# Patient Record
Sex: MALE | Race: WHITE | Employment: FULL TIME | URBAN - NONMETROPOLITAN AREA
[De-identification: names, ages, dates, MRNs, and addresses within clinical notes are randomized per-mention and may not be internally consistent; named-entity substitution may affect disease eponyms.]

---

## 2017-05-24 PROBLEM — K80.00 CALCULUS OF GALLBLADDER WITH ACUTE CHOLECYSTITIS WITHOUT OBSTRUCTION: Status: ACTIVE | Noted: 2017-05-24

## 2017-09-26 ENCOUNTER — HOSPITAL ENCOUNTER (EMERGENCY)
Age: 27
Discharge: HOME | End: 2017-09-26
Payer: MEDICAID

## 2017-09-26 VITALS
DIASTOLIC BLOOD PRESSURE: 66 MMHG | HEIGHT: 69 IN | RESPIRATION RATE: 16 BRPM | HEART RATE: 85 BPM | TEMPERATURE: 98.2 F | BODY MASS INDEX: 25.18 KG/M2 | WEIGHT: 170 LBS | SYSTOLIC BLOOD PRESSURE: 121 MMHG | OXYGEN SATURATION: 98 %

## 2017-09-26 DIAGNOSIS — K08.9 CHRONIC DENTAL PAIN: Primary | ICD-10-CM

## 2017-09-26 DIAGNOSIS — G89.29 CHRONIC DENTAL PAIN: Primary | ICD-10-CM

## 2017-09-26 PROCEDURE — 99213 OFFICE O/P EST LOW 20 MIN: CPT | Performed by: NURSE PRACTITIONER

## 2017-09-26 PROCEDURE — 99212 OFFICE O/P EST SF 10 MIN: CPT

## 2017-09-26 RX ORDER — CLINDAMYCIN HYDROCHLORIDE 150 MG/1
150 CAPSULE ORAL 3 TIMES DAILY
Qty: 30 CAPSULE | Refills: 0 | Status: SHIPPED | OUTPATIENT
Start: 2017-09-26 | End: 2017-10-06

## 2017-09-26 ASSESSMENT — PAIN SCALES - GENERAL: PAINLEVEL_OUTOF10: 6

## 2017-09-26 ASSESSMENT — PAIN DESCRIPTION - PAIN TYPE: TYPE: ACUTE PAIN

## 2017-09-26 ASSESSMENT — PAIN DESCRIPTION - LOCATION: LOCATION: TEETH

## 2017-09-26 ASSESSMENT — PAIN DESCRIPTION - ORIENTATION: ORIENTATION: LEFT;LOWER

## 2017-09-26 ASSESSMENT — ENCOUNTER SYMPTOMS
SORE THROAT: 1
RHINORRHEA: 0

## 2017-09-28 ENCOUNTER — APPOINTMENT (OUTPATIENT)
Dept: GENERAL RADIOLOGY | Age: 27
End: 2017-09-28
Payer: MEDICAID

## 2017-09-28 ENCOUNTER — HOSPITAL ENCOUNTER (EMERGENCY)
Age: 27
Discharge: HOME | End: 2017-09-28
Attending: FAMILY MEDICINE
Payer: MEDICAID

## 2017-09-28 VITALS
TEMPERATURE: 98.1 F | DIASTOLIC BLOOD PRESSURE: 74 MMHG | BODY MASS INDEX: 25.84 KG/M2 | HEART RATE: 62 BPM | OXYGEN SATURATION: 98 % | SYSTOLIC BLOOD PRESSURE: 118 MMHG | WEIGHT: 175 LBS | RESPIRATION RATE: 19 BRPM

## 2017-09-28 DIAGNOSIS — R07.9 CHEST PAIN, UNSPECIFIED TYPE: Primary | ICD-10-CM

## 2017-09-28 DIAGNOSIS — F12.10 MARIJUANA ABUSE: ICD-10-CM

## 2017-09-28 LAB
AMPHETAMINE+METHAMPHETAMINE URINE SCREEN: NEGATIVE
ANION GAP SERPL CALCULATED.3IONS-SCNC: 10 MEQ/L (ref 8–16)
BARBITURATE QUANTITATIVE URINE: NEGATIVE
BASOPHILS # BLD: 0.6 %
BASOPHILS ABSOLUTE: 0 THOU/MM3 (ref 0–0.1)
BENZODIAZEPINE QUANTITATIVE URINE: NEGATIVE
BUN BLDV-MCNC: 10 MG/DL (ref 7–22)
CALCIUM SERPL-MCNC: 9.3 MG/DL (ref 8.5–10.5)
CANNABINOID QUANTITATIVE URINE: POSITIVE
CHLORIDE BLD-SCNC: 98 MEQ/L (ref 98–111)
CO2: 29 MEQ/L (ref 23–33)
COCAINE METABOLITE QUANTITATIVE URINE: NEGATIVE
CREAT SERPL-MCNC: 1.1 MG/DL (ref 0.4–1.2)
D-DIMER QUANTITATIVE: 440 NG/ML FEU (ref 0–500)
EKG ATRIAL RATE: 51 BPM
EKG ATRIAL RATE: 78 BPM
EKG P AXIS: 72 DEGREES
EKG P AXIS: 73 DEGREES
EKG P-R INTERVAL: 164 MS
EKG P-R INTERVAL: 180 MS
EKG Q-T INTERVAL: 378 MS
EKG Q-T INTERVAL: 438 MS
EKG QRS DURATION: 94 MS
EKG QRS DURATION: 98 MS
EKG QTC CALCULATION (BAZETT): 403 MS
EKG QTC CALCULATION (BAZETT): 430 MS
EKG R AXIS: -76 DEGREES
EKG R AXIS: -77 DEGREES
EKG T AXIS: 59 DEGREES
EKG T AXIS: 67 DEGREES
EKG VENTRICULAR RATE: 51 BPM
EKG VENTRICULAR RATE: 78 BPM
EOSINOPHIL # BLD: 0.4 %
EOSINOPHILS ABSOLUTE: 0 THOU/MM3 (ref 0–0.4)
GFR SERPL CREATININE-BSD FRML MDRD: 79 ML/MIN/1.73M2
GLUCOSE BLD-MCNC: 80 MG/DL (ref 70–108)
HCT VFR BLD CALC: 49.7 % (ref 42–52)
HEMOGLOBIN: 17.1 GM/DL (ref 14–18)
LIPASE: 24.7 U/L (ref 5.6–51.3)
LYMPHOCYTES # BLD: 45 %
LYMPHOCYTES ABSOLUTE: 2.5 THOU/MM3 (ref 1–4.8)
MCH RBC QN AUTO: 31.8 PG (ref 27–31)
MCHC RBC AUTO-ENTMCNC: 34.4 GM/DL (ref 33–37)
MCV RBC AUTO: 92.5 FL (ref 80–94)
MONOCYTES # BLD: 13 %
MONOCYTES ABSOLUTE: 0.7 THOU/MM3 (ref 0.4–1.3)
NUCLEATED RED BLOOD CELLS: 0 /100 WBC
OPIATES, URINE: NEGATIVE
OSMOLALITY CALCULATION: 271.8 MOSMOL/KG (ref 275–300)
OXYCODONE: NEGATIVE
PDW BLD-RTO: 13.8 % (ref 11.5–14.5)
PHENCYCLIDINE QUANTITATIVE URINE: NEGATIVE
PLATELET # BLD: 143 THOU/MM3 (ref 130–400)
PMV BLD AUTO: 9.1 MCM (ref 7.4–10.4)
POTASSIUM SERPL-SCNC: 4.3 MEQ/L (ref 3.5–5.2)
PRO-BNP: 16.3 PG/ML (ref 0–450)
RBC # BLD: 5.37 MILL/MM3 (ref 4.7–6.1)
RBC # BLD: NORMAL 10*6/UL
SEG NEUTROPHILS: 41 %
SEGMENTED NEUTROPHILS ABSOLUTE COUNT: 2.3 THOU/MM3 (ref 1.8–7.7)
SODIUM BLD-SCNC: 137 MEQ/L (ref 135–145)
TROPONIN T: < 0.01 NG/ML
TROPONIN T: < 0.01 NG/ML
WBC # BLD: 5.6 THOU/MM3 (ref 4.8–10.8)

## 2017-09-28 PROCEDURE — 84484 ASSAY OF TROPONIN QUANT: CPT

## 2017-09-28 PROCEDURE — 6360000002 HC RX W HCPCS: Performed by: FAMILY MEDICINE

## 2017-09-28 PROCEDURE — 85379 FIBRIN DEGRADATION QUANT: CPT

## 2017-09-28 PROCEDURE — 93005 ELECTROCARDIOGRAM TRACING: CPT | Performed by: FAMILY MEDICINE

## 2017-09-28 PROCEDURE — 80048 BASIC METABOLIC PNL TOTAL CA: CPT

## 2017-09-28 PROCEDURE — 80307 DRUG TEST PRSMV CHEM ANLYZR: CPT

## 2017-09-28 PROCEDURE — 83880 ASSAY OF NATRIURETIC PEPTIDE: CPT

## 2017-09-28 PROCEDURE — 83690 ASSAY OF LIPASE: CPT

## 2017-09-28 PROCEDURE — 36415 COLL VENOUS BLD VENIPUNCTURE: CPT

## 2017-09-28 PROCEDURE — S0028 INJECTION, FAMOTIDINE, 20 MG: HCPCS | Performed by: FAMILY MEDICINE

## 2017-09-28 PROCEDURE — 96375 TX/PRO/DX INJ NEW DRUG ADDON: CPT

## 2017-09-28 PROCEDURE — 71010 XR CHEST PORTABLE: CPT

## 2017-09-28 PROCEDURE — 85025 COMPLETE CBC W/AUTO DIFF WBC: CPT

## 2017-09-28 PROCEDURE — 6370000000 HC RX 637 (ALT 250 FOR IP): Performed by: FAMILY MEDICINE

## 2017-09-28 PROCEDURE — 2500000003 HC RX 250 WO HCPCS: Performed by: FAMILY MEDICINE

## 2017-09-28 PROCEDURE — 99285 EMERGENCY DEPT VISIT HI MDM: CPT

## 2017-09-28 PROCEDURE — 96374 THER/PROPH/DIAG INJ IV PUSH: CPT

## 2017-09-28 RX ORDER — ONDANSETRON 2 MG/ML
4 INJECTION INTRAMUSCULAR; INTRAVENOUS ONCE
Status: COMPLETED | OUTPATIENT
Start: 2017-09-28 | End: 2017-09-28

## 2017-09-28 RX ORDER — OMEPRAZOLE 20 MG/1
20 CAPSULE, DELAYED RELEASE ORAL DAILY
Qty: 20 CAPSULE | Refills: 0 | Status: SHIPPED | OUTPATIENT
Start: 2017-09-28 | End: 2018-07-08

## 2017-09-28 RX ORDER — ORPHENADRINE CITRATE 30 MG/ML
60 INJECTION INTRAMUSCULAR; INTRAVENOUS ONCE
Status: DISCONTINUED | OUTPATIENT
Start: 2017-09-28 | End: 2017-09-28 | Stop reason: HOSPADM

## 2017-09-28 RX ADMIN — FAMOTIDINE 20 MG: 10 INJECTION, SOLUTION INTRAVENOUS at 11:04

## 2017-09-28 RX ADMIN — ONDANSETRON 4 MG: 2 INJECTION INTRAMUSCULAR; INTRAVENOUS at 11:04

## 2017-09-28 RX ADMIN — Medication 30 ML: at 11:02

## 2017-09-28 ASSESSMENT — ENCOUNTER SYMPTOMS
BACK PAIN: 0
NAUSEA: 1
VOMITING: 1
ABDOMINAL PAIN: 0
COUGH: 0
RHINORRHEA: 0
EYE DISCHARGE: 0
SHORTNESS OF BREATH: 0
TROUBLE SWALLOWING: 1
SORE THROAT: 0
EYE REDNESS: 0
DIARRHEA: 0
WHEEZING: 0

## 2017-09-28 ASSESSMENT — PAIN DESCRIPTION - PROGRESSION: CLINICAL_PROGRESSION: GRADUALLY IMPROVING

## 2017-09-28 ASSESSMENT — PAIN DESCRIPTION - FREQUENCY: FREQUENCY: CONTINUOUS

## 2017-09-28 ASSESSMENT — PAIN DESCRIPTION - PAIN TYPE: TYPE: ACUTE PAIN

## 2017-09-28 ASSESSMENT — PAIN SCALES - GENERAL: PAINLEVEL_OUTOF10: 4

## 2017-09-28 ASSESSMENT — PAIN DESCRIPTION - ORIENTATION: ORIENTATION: LEFT;MID

## 2017-09-28 ASSESSMENT — PAIN DESCRIPTION - ONSET: ONSET: SUDDEN

## 2017-09-28 ASSESSMENT — PAIN DESCRIPTION - LOCATION: LOCATION: CHEST

## 2017-09-28 ASSESSMENT — PAIN DESCRIPTION - DESCRIPTORS: DESCRIPTORS: ACHING;SHARP

## 2017-09-28 NOTE — ED PROVIDER NOTES
Reviewed   CBC WITH AUTO DIFFERENTIAL - Abnormal; Notable for the following:        Result Value    MCH 31.8 (*)     All other components within normal limits   GLOMERULAR FILTRATION RATE, ESTIMATED - Abnormal; Notable for the following:     Est, Glom Filt Rate 79 (*)     All other components within normal limits   OSMOLALITY - Abnormal; Notable for the following:     Osmolality Calc 271.8 (*)     All other components within normal limits   BASIC METABOLIC PANEL   BRAIN NATRIURETIC PEPTIDE   TROPONIN   URINE DRUG SCREEN   D-DIMER, QUANTITATIVE   LIPASE   ANION GAP   TROPONIN       EMERGENCY DEPARTMENT COURSE:   Vitals:    Vitals:    09/28/17 1022 09/28/17 1153 09/28/17 1216 09/28/17 1331   BP: 135/83 111/72  118/74   Pulse: 83 69  62   Resp: 13 14  19   Temp: 98.1 °F (36.7 °C)      TempSrc: Oral      SpO2: 96% 97%  98%   Weight:   175 lb (79.4 kg)        10:41 AM: The patient was seen and evaluated. MDM:  The patient was seen and evaluated within the ED today following chest pain. Within the department, I observed the patient's vital signs to be within acceptable range. On exam, I appreciated mid chest tenderness. Radiological studies within the department revealed no acute cardiopulmonary process. Laboratory work was reassuring with normal troponin and anion gap levels. Within the department, the patient was treated with Norflex, Pepcid, Zofran, and gi cocktail. I observed the patient's condition to improve during the duration of their stay. The patient was offered a cardiologist within the Hospital but declined saying he preferred to follow up with his sister's cardiologist. I recommended several primary care providers but he denied any. I recommended subsequent evaluation including an EG and a colonoscopy. I explained my proposed course of treatment to the patient, and they were amenable to my decision.  They were discharged home, and they will return to the ED if their symptoms become more severe in nature, or otherwise change. He was also advised to establish care with a primary care physician. CRITICAL CARE:   None     CONSULTS:  None    PROCEDURES:  None     FINAL IMPRESSION      1. Chest pain, atypical    2. Marijuana abuse          DISPOSITION/PLAN   Discharge    PATIENT REFERRED TO:  No follow-up provider specified. DISCHARGE MEDICATIONS:  Discharge Medication List as of 9/28/2017  1:40 PM      START taking these medications    Details   omeprazole (PRILOSEC) 20 MG delayed release capsule Take 1 capsule by mouth daily, Disp-20 capsule, R-0Print             (Please note that portions of this note were completed with a voice recognition program.  Efforts were made to edit the dictations but occasionally words are mis-transcribed.)    Scribe:  Weston Lyons 9/28/17 10:41 AM Scribing for and in the presence of Adolfo Lund MD.    Signed by: Esperanza Galvez, 09/28/17 10:47 PM    Provider:  I personally performed the services described in the documentation, reviewed and edited the documentation which was dictated to the scribe in my presence, and it accurately records my words and actions.     Adolfo Lund MD 9/28/17 10:47 PM         Adolfo Lund MD  09/28/17 1870

## 2017-09-28 NOTE — ED AVS SNAPSHOT
After Visit Summary  (Discharge Instructions)    Medication List for Home    Based on the information you provided to us as well as any changes during this visit, the following is your updated medication list.  Compare this with your prescription bottles at home. If you have any questions or concerns, contact your primary care physician's office. Daily Medication List (This medication list can be shared with any Healthcare provider who is helping you manage your medications)      There are NEW medications for you. START taking them after you leave the hospital     omeprazole 20 MG delayed release capsule   Commonly known as:  PRILOSEC   Take 1 capsule by mouth daily         ASK your doctor about these medications if you have questions     clindamycin 150 MG capsule   Commonly known as:  CLEOCIN   Take 1 capsule by mouth 3 times daily for 10 days            Where to Get Your Medications      You can get these medications from any pharmacy     Bring a paper prescription for each of these medications     omeprazole 20 MG delayed release capsule               Allergies as of 9/28/2017        Reactions    Sulfa Antibiotics       Immunizations as of 9/28/2017     No immunizations on file. After Visit Summary    This summary was created for you. Thank you for entrusting your care to us. The following information includes details about your hospital/visit stay along with steps you should take to help with your recovery once you leave the hospital.  In this packet, you will find information about the topics listed below:    · Instructions about your medications including a list of your home medications  · A summary of your hospital visit  · Follow-up appointments once you have left the hospital  · Your care plan at home      You may receive a survey regarding the care you received during your stay. Your input is valuable to us.  We encourage you to complete and return your survey in the envelope provided. We hope you will choose us in the future for your healthcare needs. Patient Information     Patient Name HUYEN Carlton 1990      Care Provided at:     Name Address Phone       [address] [phone]            Your Visit    Here you will find information about your visit, including the reason for your visit. Please take this sheet with you when you visit your doctor or other health care provider in the future. It will help determine the best possible medical care for you at that time. If you have any questions once you leave the hospital, please call the department phone number listed below. Diagnoses this visit     Your diagnosis was CHEST PAIN, UNSPECIFIED TYPE. Visit Information     Date of Visit Department Dept Phone    2017 [facility] EMERGENCY DEPT [phone]      You were seen by     You were seen by Trinh Evans MD.       Follow-up Appointments    Below is a list of your follow-up and future appointments. This may not be a complete list as you may have made appointments directly with providers that we are not aware of or your providers may have made some for you. Please call your providers to confirm appointments. It is important to keep your appointments. Please bring your current insurance card, photo ID, co-pay, and all medication bottles to your appointment. If self-pay, payment is expected at the time of service. Preventive Care        Date Due    HIV screening is recommended for all people regardless of risk factors  aged 15-65 years at least once (lifetime) who have never been HIV tested.  2004    Tetanus Combination Vaccine (1 - Tdap) 2008    Pneumococcal Vaccine - Pneumovax for adults aged 19-64 years with: chronic heart disease, chronic lung disease, diabetes mellitus, alcoholism, chronic liver disease, or cigarette smoking. (1 of 1 - PPSV23) 4/17/2008    Yearly Flu Vaccine (1) 9/1/2017                 Care Plan Once You Return Home    This section includes instructions you will need to follow once you leave the hospital.  Your care team will discuss these with you, so you and those caring for you know how to best care for your health needs at home. This section may also include educational information about certain health topics that may be of help to you. Important Information if you smoke or are exposed to smoking       SMOKING: QUIT SMOKING. THIS IS THE MOST IMPORTANT ACTION YOU CAN TAKE TO IMPROVE YOUR CURRENT AND FUTURE HEALTH. Call the [facility] [place] [facility] [place] NOW ([phone])    Smoking harms nonsmokers. When nonsmokers are around people who smoke, they absorb nicotine, carbon monoxide, and other ingredients of tobacco smoke. DO NOT SMOKE AROUND CHILDREN     Children exposed to secondhand smoke are at an increased risk of:  Sudden Infant Death Syndrome (SIDS), acute respiratory infections, inflammation of the middle ear, and severe asthma. Over a longer time, it causes heart disease and lung cancer. There is no safe level of exposure to secondhand smoke. Important information for a smoker       SMOKING: QUIT SMOKING. THIS IS THE MOST IMPORTANT ACTION YOU CAN TAKE TO IMPROVE YOUR CURRENT AND FUTURE HEALTH. Call the [facility] [place] [facility] [place] NOW ([phone])    Smoking harms nonsmokers. When nonsmokers are around people who smoke, they absorb nicotine, carbon monoxide, and other ingredients of tobacco smoke. DO NOT SMOKE AROUND CHILDREN     Children exposed to secondhand smoke are at an increased risk of:  Sudden Infant Death Syndrome (SIDS), acute respiratory infections, inflammation of the middle ear, and severe asthma.   Over a longer time, it causes heart disease and lung cancer. There is no safe level of exposure to secondhand smoke. MyChart Signup     Our records indicate that you have an active Cooleafhart account. You can view your After Visit Summary by going to [email or web address] and logging in with your [employer] username and password. If you don't have a [employer] username and password but a parent or guardian has access to your record, the parent or guardian should login with their own [employer] username and password and access your record to view the After Visit Summary. Additional Information  If you have questions, please contact the physician practice where you receive care. Remember, Cooleafhart is NOT to be used for urgent needs. For medical emergencies, dial 911. For questions regarding your Cooleafhart account call [phone]. If you have a clinical question, please call your doctor's office. View your information online  ? Review your current list of  medications, immunization, and allergies. ? Review your future test results online . ? Review your discharge instructions provided by your caregivers at discharge    Certain functionality such as prescription refills, scheduling appointments or sending messages to your provider are not activated if your provider does not use REVENTIVE in his/her office    For questions regarding your MyChart account call [phone]. If you have a clinical question, please call your doctor's office. The information on all pages of the After Visit Summary has been reviewed with me, the patient and/or responsible adult, by my health care provider(s). I had the opportunity to ask questions regarding this information. I understand I should dispose of my armband safely at home to protect my health information. A complete copy of the After Visit Summary has been given to me, the patient and/or responsible adult. ¨ Shortness of breath. ¨ Nausea or vomiting. ¨ Pain, pressure, or a strange feeling in your back, neck, jaw, or upper belly or in one or both shoulders or arms. ¨ Lightheadedness or sudden weakness. ¨ A fast or irregular heartbeat. After you call 911, the  may tell you to chew 1 adult-strength or 2 to 4 low-dose aspirin. Wait for an ambulance. Do not try to drive yourself. Call your doctor today if:  · You have any trouble breathing. · Your chest pain gets worse. · You are dizzy or lightheaded, or you feel like you may faint. · You are not getting better as expected. · You are having new or different chest pain. Where can you learn more? Go to [email or web address] and sign in to your ICU Metrix account. Enter A120 in the Metaresolver box to learn more about \"Chest Pain: Care Instructions. \"     If you do not have an account, please click on the \"Sign Up Now\" link. Current as of: March 20, 2017  Content Version: 11.3  © 0953-2415 [employer], Digital[employer]. Care instructions adapted under license by [facility] ([facility]). If you have questions about a medical condition or this instruction, always ask your healthcare professional. [address]  any warranty or liability for your use of this information.

## 2017-09-28 NOTE — ED TRIAGE NOTES
Pt to ED w/rprts of sudden chest pain this morning while at rest. Pt rprts for a moment everything around him went dark. Pt presents to ED alert and oriented x4. Breathing easy and unlabored. Pt placed on cardiac monitor and in gown. EKG in process. EMS rprts administering aspirin 81 mg x4. Pt rprts an improvement of chest pain. Rates pain 4/10.

## 2018-07-08 ENCOUNTER — HOSPITAL ENCOUNTER (EMERGENCY)
Age: 28
Discharge: HOME | End: 2018-07-08
Attending: FAMILY MEDICINE
Payer: MEDICAID

## 2018-07-08 ENCOUNTER — APPOINTMENT (OUTPATIENT)
Dept: CT IMAGING | Age: 28
End: 2018-07-08
Payer: MEDICAID

## 2018-07-08 VITALS
HEART RATE: 66 BPM | WEIGHT: 165 LBS | BODY MASS INDEX: 24.44 KG/M2 | OXYGEN SATURATION: 98 % | HEIGHT: 69 IN | TEMPERATURE: 97.7 F | DIASTOLIC BLOOD PRESSURE: 78 MMHG | SYSTOLIC BLOOD PRESSURE: 119 MMHG | RESPIRATION RATE: 17 BRPM

## 2018-07-08 DIAGNOSIS — R00.2 PALPITATIONS: Primary | ICD-10-CM

## 2018-07-08 DIAGNOSIS — R20.0 NUMBNESS: ICD-10-CM

## 2018-07-08 LAB
ACETAMINOPHEN LEVEL: < 5 UG/ML (ref 0–20)
ALBUMIN SERPL-MCNC: 4.9 G/DL (ref 3.5–5.1)
ALP BLD-CCNC: 38 U/L (ref 38–126)
ALT SERPL-CCNC: 18 U/L (ref 11–66)
AMPHETAMINE+METHAMPHETAMINE URINE SCREEN: NEGATIVE
ANION GAP SERPL CALCULATED.3IONS-SCNC: 15 MEQ/L (ref 8–16)
AST SERPL-CCNC: 19 U/L (ref 5–40)
BARBITURATE QUANTITATIVE URINE: NEGATIVE
BASOPHILS # BLD: 0.6 %
BASOPHILS ABSOLUTE: 0 THOU/MM3 (ref 0–0.1)
BENZODIAZEPINE QUANTITATIVE URINE: NEGATIVE
BILIRUB SERPL-MCNC: 0.4 MG/DL (ref 0.3–1.2)
BILIRUBIN DIRECT: < 0.2 MG/DL (ref 0–0.3)
BILIRUBIN URINE: NEGATIVE
BLOOD, URINE: NEGATIVE
BUN BLDV-MCNC: 7 MG/DL (ref 7–22)
CALCIUM SERPL-MCNC: 10 MG/DL (ref 8.5–10.5)
CANNABINOID QUANTITATIVE URINE: POSITIVE
CHARACTER, URINE: CLEAR
CHLORIDE BLD-SCNC: 99 MEQ/L (ref 98–111)
CO2: 26 MEQ/L (ref 23–33)
COCAINE METABOLITE QUANTITATIVE URINE: NEGATIVE
COLOR: YELLOW
CREAT SERPL-MCNC: 1.1 MG/DL (ref 0.4–1.2)
EKG ATRIAL RATE: 105 BPM
EKG P AXIS: 80 DEGREES
EKG P-R INTERVAL: 166 MS
EKG Q-T INTERVAL: 348 MS
EKG QRS DURATION: 94 MS
EKG QTC CALCULATION (BAZETT): 459 MS
EKG R AXIS: -87 DEGREES
EKG T AXIS: 71 DEGREES
EKG VENTRICULAR RATE: 105 BPM
EOSINOPHIL # BLD: 1.1 %
EOSINOPHILS ABSOLUTE: 0.1 THOU/MM3 (ref 0–0.4)
ERYTHROCYTE [DISTWIDTH] IN BLOOD BY AUTOMATED COUNT: 12.9 % (ref 11.5–14.5)
ERYTHROCYTE [DISTWIDTH] IN BLOOD BY AUTOMATED COUNT: 43.8 FL (ref 35–45)
ETHYL ALCOHOL, SERUM: < 0.01 %
GFR SERPL CREATININE-BSD FRML MDRD: 79 ML/MIN/1.73M2
GLUCOSE BLD-MCNC: 111 MG/DL (ref 70–108)
GLUCOSE URINE: NEGATIVE MG/DL
HCT VFR BLD CALC: 48.3 % (ref 42–52)
HEMOGLOBIN: 16.4 GM/DL (ref 14–18)
IMMATURE GRANS (ABS): 0.01 THOU/MM3 (ref 0–0.07)
IMMATURE GRANULOCYTES: 0.1 %
KETONES, URINE: NEGATIVE
LEUKOCYTE ESTERASE, URINE: NEGATIVE
LYMPHOCYTES # BLD: 30.7 %
LYMPHOCYTES ABSOLUTE: 2.5 THOU/MM3 (ref 1–4.8)
MAGNESIUM: 2.2 MG/DL (ref 1.6–2.4)
MCH RBC QN AUTO: 31.4 PG (ref 26–33)
MCHC RBC AUTO-ENTMCNC: 34 GM/DL (ref 32.2–35.5)
MCV RBC AUTO: 92.5 FL (ref 80–94)
MONOCYTES # BLD: 7.7 %
MONOCYTES ABSOLUTE: 0.6 THOU/MM3 (ref 0.4–1.3)
NITRITE, URINE: NEGATIVE
NUCLEATED RED BLOOD CELLS: 0 /100 WBC
OPIATES, URINE: NEGATIVE
OSMOLALITY CALCULATION: 278.1 MOSMOL/KG (ref 275–300)
OXYCODONE: NEGATIVE
PH UA: 6.5
PHENCYCLIDINE QUANTITATIVE URINE: NEGATIVE
PLATELET # BLD: 285 THOU/MM3 (ref 130–400)
PMV BLD AUTO: 10.2 FL (ref 9.4–12.4)
POTASSIUM SERPL-SCNC: 3.6 MEQ/L (ref 3.5–5.2)
PRO-BNP: 9.5 PG/ML (ref 0–450)
PROTEIN UA: NEGATIVE
RBC # BLD: 5.22 MILL/MM3 (ref 4.7–6.1)
SALICYLATE, SERUM: < 0.3 MG/DL (ref 2–10)
SEG NEUTROPHILS: 59.8 %
SEGMENTED NEUTROPHILS ABSOLUTE COUNT: 4.9 THOU/MM3 (ref 1.8–7.7)
SODIUM BLD-SCNC: 140 MEQ/L (ref 135–145)
SPECIFIC GRAVITY, URINE: 1.01 (ref 1–1.03)
TOTAL PROTEIN: 7.7 G/DL (ref 6.1–8)
TROPONIN T: < 0.01 NG/ML
TROPONIN T: < 0.01 NG/ML
UROBILINOGEN, URINE: 0.2 EU/DL
WBC # BLD: 8.2 THOU/MM3 (ref 4.8–10.8)

## 2018-07-08 PROCEDURE — G0480 DRUG TEST DEF 1-7 CLASSES: HCPCS

## 2018-07-08 PROCEDURE — 80307 DRUG TEST PRSMV CHEM ANLYZR: CPT

## 2018-07-08 PROCEDURE — 93225 XTRNL ECG REC<48 HRS REC: CPT

## 2018-07-08 PROCEDURE — 99285 EMERGENCY DEPT VISIT HI MDM: CPT

## 2018-07-08 PROCEDURE — 85025 COMPLETE CBC W/AUTO DIFF WBC: CPT

## 2018-07-08 PROCEDURE — 70450 CT HEAD/BRAIN W/O DYE: CPT

## 2018-07-08 PROCEDURE — 93226 XTRNL ECG REC<48 HR SCAN A/R: CPT

## 2018-07-08 PROCEDURE — 83880 ASSAY OF NATRIURETIC PEPTIDE: CPT

## 2018-07-08 PROCEDURE — 82248 BILIRUBIN DIRECT: CPT

## 2018-07-08 PROCEDURE — 2580000003 HC RX 258: Performed by: FAMILY MEDICINE

## 2018-07-08 PROCEDURE — 83735 ASSAY OF MAGNESIUM: CPT

## 2018-07-08 PROCEDURE — 84484 ASSAY OF TROPONIN QUANT: CPT

## 2018-07-08 PROCEDURE — 81003 URINALYSIS AUTO W/O SCOPE: CPT

## 2018-07-08 PROCEDURE — 36415 COLL VENOUS BLD VENIPUNCTURE: CPT

## 2018-07-08 PROCEDURE — 80053 COMPREHEN METABOLIC PANEL: CPT

## 2018-07-08 PROCEDURE — 93005 ELECTROCARDIOGRAM TRACING: CPT | Performed by: FAMILY MEDICINE

## 2018-07-08 RX ORDER — HYDROXYZINE HYDROCHLORIDE 25 MG/1
25 TABLET, FILM COATED ORAL ONCE
Status: DISCONTINUED | OUTPATIENT
Start: 2018-07-08 | End: 2018-07-08 | Stop reason: HOSPADM

## 2018-07-08 RX ORDER — HYDROXYZINE HYDROCHLORIDE 10 MG/1
10 TABLET, FILM COATED ORAL ONCE
Status: DISCONTINUED | OUTPATIENT
Start: 2018-07-08 | End: 2018-07-08

## 2018-07-08 RX ORDER — 0.9 % SODIUM CHLORIDE 0.9 %
1000 INTRAVENOUS SOLUTION INTRAVENOUS ONCE
Status: COMPLETED | OUTPATIENT
Start: 2018-07-08 | End: 2018-07-08

## 2018-07-08 RX ADMIN — SODIUM CHLORIDE 1000 ML: 9 INJECTION, SOLUTION INTRAVENOUS at 12:59

## 2018-07-08 ASSESSMENT — ENCOUNTER SYMPTOMS
EYE DISCHARGE: 0
DIARRHEA: 0
VOMITING: 0
ABDOMINAL PAIN: 0
SHORTNESS OF BREATH: 0
COUGH: 0
WHEEZING: 0
EYE REDNESS: 0
NAUSEA: 0
BACK PAIN: 0
SORE THROAT: 0
RHINORRHEA: 0

## 2018-07-08 NOTE — ED NOTES
Pt very anxious upon entering room at this time. Pt explaining that he doesn't feel as though this is anxiety because \"I am mentally calm but my heart just took off\". Informed pt that the medication offered is to help him with this feeling, however, he keeps denying medications.  Pt reporting that he will not take medications because he feels that he has throat cancer and needs to see a specialist.      Asher Lubin RN  07/08/18 4099

## 2018-07-08 NOTE — ED PROVIDER NOTES
[facility]  eMERGENCY dEPARTMENT eNCOUnter          CHIEF COMPLAINT       Chief Complaint   Patient presents with    Numbness     Nurses Notes reviewed and I agree except as noted in the HPI. HISTORY OF PRESENT ILLNESS    Janell Ron is a 33 y.o. male who presents to the Emergency Department for the evaluation of numbness. The patient states he was having a deep conversation with his friend, when he bent over and when he came back up, he felt lightheaded and his \"world went black. \" He then sat down and started to drink water. Patient states that 10 minutes later, his body went numb and he had heart palpitations. He states he also had dry mouth. Patient denies diaphoresis or chest pain. The patient states this happens often, but this time it was worse. He came here last time, and was told nothing was wrong with him. Patient states he was anxious during the conversation due to it being \"deep,\" but denies anxiety being the cause of his symptoms due to him being \"mentally calm. 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 He denies marijuana being the cause of his symptoms. He states he did smoke marijuana this morning. The patient states he drinks 4-6 oz of alcohol every day. No further complaints at the time of the initial encounter. The HPI was provided by the patient. REVIEW OF SYSTEMS     Review of Systems   Constitutional: Negative for appetite change, chills, diaphoresis, fatigue and fever. HENT: Negative for congestion, ear pain, rhinorrhea and sore throat. Eyes: Positive for visual disturbance (\"world went black\"). Negative for discharge and redness. Respiratory: Negative for cough, shortness of breath and wheezing. Cardiovascular: Positive for palpitations. Negative for chest pain and leg swelling. Gastrointestinal: Negative for abdominal pain, diarrhea, nausea and vomiting.    Genitourinary: Negative normal limits   GLOMERULAR FILTRATION RATE, ESTIMATED - Abnormal; Notable for the following:     Est, Glom Filt Rate 79 (*)     All other components within normal limits   CBC WITH AUTO DIFFERENTIAL   HEPATIC FUNCTION PANEL   TROPONIN   BRAIN NATRIURETIC PEPTIDE   URINE RT REFLEX TO CULTURE   MAGNESIUM   ETHANOL   URINE DRUG SCREEN   ACETAMINOPHEN LEVEL   ANION GAP   OSMOLALITY   TROPONIN       EMERGENCY DEPARTMENT COURSE:   Vitals:    Vitals:    07/08/18 1236 07/08/18 1348 07/08/18 1515 07/08/18 1631   BP: 129/88 132/88 119/78    Pulse: 94 82 69 66   Resp: 16 17 15 17   Temp: 97.7 °F (36.5 °C)      TempSrc: Oral      SpO2: 99% 98% 97% 98%   Weight: 165 lb (74.8 kg)      Height: 5' 9\" (1.753 m)          12:48 PM: The patient was seen and evaluated. MDM:  The patient presents to the ED with complaints of numbness. The patient was not in any acute distress. The patient's physical exam showed no neurological deficits. Heart and lungs sounds were normal. Within the department, the patient was treated with IV fluids and Atarax. Patient's status improved during the duration of their stay. Labs and imaging were ordered, and reviewed with the patient. He tested positive for cannabis. CT head without contrast showed no acute findings. EKG showed sinus tachycardia. I explained my proposed course of treatment with the patient, and they were amenable to my decision. I discussed with the patient that his symptoms likely related to anxiety. Patient states that this is anxiety and he has been practicing meditation for visit. Patient was provided with a Holter monitor. Patient also reported that he has been having chronic sore throat and he was provided with the ENT information. Patient was also provided a list of primary care physicians to establish care. Precautions were discussed with the patient. The patient will be discharged home, and will need to follow-up with [facility] [place] [address].  The patient will need to come back to the ER if their symptoms worsen, or become more severe in nature. CRITICAL CARE:   None    CONSULTS:  None    PROCEDURES:  None     FINAL IMPRESSION      1. Palpitations    2. Numbness          DISPOSITION/PLAN   Discharged    PATIENT REFERRED TO:  Lilia Millan MD  [address] [facility] [phone]    Schedule an appointment as soon as possible for a visit in 3 days      Gus Ornelas MD  [facility], [address] [place] [address]  [phone]    Schedule an appointment as soon as possible for a visit in 3 days        DISCHARGE MEDICATIONS:  Discharge Medication List as of 7/8/2018  4:10 PM          (Please note that portions of this note were completed with a voice recognition program.  Efforts were made to edit the dictations but occasionally words are mis-transcribed.)    Scribe:  Leonel Lopez 7/8/18 12:48 PM Scribing for and in the presence of Wilber May MD.    Signed by: Esperanza Cook, 07/08/18 8:33 PM    Provider:  I personally performed the services described in the documentation, reviewed and edited the documentation which was dictated to the scribe in my presence, and it accurately records my words and actions.     Wilber May MD 7/8/18 8:33 PM        Wilber May MD  07/08/18 2037

## 2018-07-08 NOTE — ED NOTES
Patient refused atarax. He states he is very mentally calm and does not need it.       Jasson Moe RN  07/08/18 3407

## 2018-07-08 NOTE — ED TRIAGE NOTES
Presents to ED with c/o numbness all over. Reports he was having a deep conversation with someone when he started feeling numb all over. Reports he still feels numb. Reports he has had this happen multiple times and they can never find anything wrong with him. Patient is alert and oriented. Reports smoking marijuana today.

## 2018-07-09 PROCEDURE — 93010 ELECTROCARDIOGRAM REPORT: CPT | Performed by: NUCLEAR MEDICINE

## 2018-07-23 NOTE — PROCEDURES
[address] [place] [address]                                  HOLTER MONITOR    PATIENT NAME: Deanna York                :        1990  MED REC NO:   [id number]                           ROOM:       007  ACCOUNT NO:   [de-identified]                           ADMIT DATE: 2018  PROVIDER:     CLAU Prince 23 HOURS AND 55 MINUTES    DATE OF STUDY:  2018    CLINICAL HISTORY AND INDICATION:  This is a 79-year-old patient with  tachycardia. HOLTER MONITOR DESCRIPTION:  Holter monitor was attached to the patient for  a total of 23 hours 55 minutes. Total number of beats was 216956. HOLTER MONITOR ANALYSIS:  Minimum heart rate was 48 beats per minute. Maximum heart rate was 157 beats per minute. Average heart rate was 95  beats per minute. Predominant rhythm was sinus rhythm with relative sinus  tachycardia for the patient's age, there was no other arrhythmia, no  V-tach, SVT or pauses. CONCLUSION:  1. Sinus rhythm with relative sinus tachycardia. Average heart rate  higher than expected for the patient's age. 2.  Otherwise no sustained arrhythmias.         Sharyn Brody M.D.    D: 2018 7:26:09       T: 2018 7:27:16     [email or web address]  Job#: [id number]     Doc#: [id number]    CC:

## 2018-08-04 ENCOUNTER — HOSPITAL ENCOUNTER (EMERGENCY)
Age: 28
Discharge: HOME | End: 2018-08-04

## 2018-08-04 VITALS
HEART RATE: 74 BPM | OXYGEN SATURATION: 98 % | WEIGHT: 170 LBS | DIASTOLIC BLOOD PRESSURE: 81 MMHG | TEMPERATURE: 99 F | RESPIRATION RATE: 16 BRPM | BODY MASS INDEX: 25.1 KG/M2 | SYSTOLIC BLOOD PRESSURE: 134 MMHG

## 2018-08-04 DIAGNOSIS — R36.9 PENILE DISCHARGE: ICD-10-CM

## 2018-08-04 DIAGNOSIS — Z20.2 POSSIBLE EXPOSURE TO STD: Primary | ICD-10-CM

## 2018-08-04 LAB
CHLAMYDIA TRACHOMATIS BY RT-PCR: DETECTED
CT/NG SOURCE: ABNORMAL
NEISSERIA GONORRHOEAE BY RT-PCR: NOT DETECTED

## 2018-08-04 PROCEDURE — 6370000000 HC RX 637 (ALT 250 FOR IP): Performed by: NURSE PRACTITIONER

## 2018-08-04 PROCEDURE — 96372 THER/PROPH/DIAG INJ SC/IM: CPT

## 2018-08-04 PROCEDURE — 99213 OFFICE O/P EST LOW 20 MIN: CPT | Performed by: NURSE PRACTITIONER

## 2018-08-04 PROCEDURE — 87591 N.GONORRHOEAE DNA AMP PROB: CPT

## 2018-08-04 PROCEDURE — 87491 CHLMYD TRACH DNA AMP PROBE: CPT

## 2018-08-04 PROCEDURE — 2500000003 HC RX 250 WO HCPCS: Performed by: NURSE PRACTITIONER

## 2018-08-04 PROCEDURE — 99214 OFFICE O/P EST MOD 30 MIN: CPT

## 2018-08-04 PROCEDURE — 6360000002 HC RX W HCPCS: Performed by: NURSE PRACTITIONER

## 2018-08-04 RX ORDER — AZITHROMYCIN 250 MG/1
1000 TABLET, FILM COATED ORAL ONCE
Status: COMPLETED | OUTPATIENT
Start: 2018-08-04 | End: 2018-08-04

## 2018-08-04 RX ADMIN — LIDOCAINE HYDROCHLORIDE 250 MG: 10 INJECTION, SOLUTION EPIDURAL; INFILTRATION; INTRACAUDAL; PERINEURAL at 09:16

## 2018-08-04 RX ADMIN — AZITHROMYCIN 1000 MG: 250 TABLET, FILM COATED ORAL at 09:18

## 2018-08-04 ASSESSMENT — ENCOUNTER SYMPTOMS
WHEEZING: 0
NAUSEA: 0
CHEST TIGHTNESS: 0
SHORTNESS OF BREATH: 0
VOMITING: 0
ABDOMINAL PAIN: 0

## 2018-08-04 NOTE — ED PROVIDER NOTES
smokeless tobacco. He reports that he drinks alcohol. He reports that he uses drugs, including Marijuana. PHYSICAL EXAM     ED TRIAGE VITALS  BP: 125/83, Temp: 99.5 °F (37.5 °C), Pulse: 71, Resp: 16, SpO2: 97 %,Estimated body mass index is 25.1 kg/m² as calculated from the following:    Height as of 7/8/18: 5' 9\" (1.753 m). Weight as of this encounter: 170 lb (77.1 kg). ,No LMP for male patient. Physical Exam   Constitutional: He is oriented to person, place, and time. He appears well-developed and well-nourished. No distress. Cardiovascular: Normal rate, regular rhythm and normal heart sounds. Pulmonary/Chest: Effort normal and breath sounds normal. No respiratory distress. He has no wheezes. Abdominal: Soft. Bowel sounds are normal. There is no tenderness. There is no rebound and no guarding. Genitourinary:   Genitourinary Comments: Deferred per patient, will treat based on symptoms   Neurological: He is alert and oriented to person, place, and time. Skin: Skin is warm and dry. No rash noted. He is not diaphoretic. Psychiatric: He has a normal mood and affect. Nursing note and vitals reviewed. DIAGNOSTIC RESULTS   Labs:No results found for this visit on 08/04/18. IMAGING:    No orders to display         EKG: none      URGENT CARE COURSE:     Vitals:    08/04/18 0857   BP: 125/83   Pulse: 71   Resp: 16   Temp: 99.5 °F (37.5 °C)   TempSrc: Temporal   SpO2: 97%   Weight: 170 lb (77.1 kg)       Medications   azithromycin (ZITHROMAX) tablet 1,000 mg (1,000 mg Oral Given 8/4/18 0918)   cefTRIAXone (ROCEPHIN) 250 mg in lidocaine 1 % 1 mL IM Injection (250 mg Intramuscular Given 8/4/18 0916)            PROCEDURES:  None    FINAL IMPRESSION      1. Possible exposure to STD    2. Penile discharge          DISPOSITION/PLAN   DISPOSITION Decision To Discharge 08/04/2018 09:10:52 AM    Patient will be treated based on his symptoms with azithromycin and Rocephin today.   She is instructed to notify his partners of his symptoms, or especially if he had a positive test result is able need tested. He is instructed to remain free from any sexual activity for the next week and to follow-up with his local health department in one to 2 weeks if he is positive. He is instructed that he'll be notified of a positive result via phone. He is instructed to present to the emergency department for any worsening of symptoms. PATIENT REFERRED TO:  No primary care provider on file. DISCHARGE MEDICATIONS:  New Prescriptions    No medications on file       Discontinued Medications    No medications on file       There are no discharge medications for this patient.       TRENTON Pope - CNP    (Please note that portions of this note were completed with a voice recognition program.  Efforts were made to edit the dictations but occasionally words are mis-transcribed.)          TRENTON Pope CNP  08/04/18 0379

## 2018-08-04 NOTE — ED NOTES
Pt discharged. Pt here for STD exposure. Pt was treated and discharged. Pt walked out per self in stable condition.      Soumya Giron, RAMU  61/95/61 [phone]

## 2019-01-21 ENCOUNTER — HOSPITAL ENCOUNTER (EMERGENCY)
Age: 29
Discharge: HOME | End: 2019-01-21

## 2019-01-21 VITALS
HEART RATE: 88 BPM | BODY MASS INDEX: 25.1 KG/M2 | TEMPERATURE: 99.8 F | SYSTOLIC BLOOD PRESSURE: 115 MMHG | WEIGHT: 170 LBS | DIASTOLIC BLOOD PRESSURE: 71 MMHG | RESPIRATION RATE: 16 BRPM | OXYGEN SATURATION: 95 %

## 2019-01-21 DIAGNOSIS — J02.9 ACUTE PHARYNGITIS, UNSPECIFIED ETIOLOGY: Primary | ICD-10-CM

## 2019-01-21 LAB
GROUP A STREP CULTURE, REFLEX: NEGATIVE
REFLEX THROAT C + S: NORMAL

## 2019-01-21 PROCEDURE — 87070 CULTURE OTHR SPECIMN AEROBIC: CPT

## 2019-01-21 PROCEDURE — 99213 OFFICE O/P EST LOW 20 MIN: CPT | Performed by: NURSE PRACTITIONER

## 2019-01-21 PROCEDURE — 99213 OFFICE O/P EST LOW 20 MIN: CPT

## 2019-01-21 RX ORDER — CETIRIZINE HYDROCHLORIDE 10 MG/1
10 TABLET ORAL DAILY
Qty: 30 TABLET | Refills: 0 | Status: SHIPPED | OUTPATIENT
Start: 2019-01-21 | End: 2019-02-20

## 2019-01-21 RX ORDER — AZELASTINE 1 MG/ML
1 SPRAY, METERED NASAL 2 TIMES DAILY
Qty: 1 BOTTLE | Refills: 0 | Status: SHIPPED | OUTPATIENT
Start: 2019-01-21 | End: 2019-06-24

## 2019-01-21 RX ORDER — PSEUDOEPHEDRINE HYDROCHLORIDE 30 MG/1
30 TABLET ORAL EVERY 6 HOURS PRN
Qty: 24 TABLET | Refills: 0 | Status: SHIPPED | OUTPATIENT
Start: 2019-01-21 | End: 2019-01-28

## 2019-01-21 RX ORDER — DEXTROMETHORPHAN HYDROBROMIDE AND PROMETHAZINE HYDROCHLORIDE 15; 6.25 MG/5ML; MG/5ML
5 SYRUP ORAL 4 TIMES DAILY PRN
Qty: 100 ML | Refills: 0 | Status: SHIPPED | OUTPATIENT
Start: 2019-01-21 | End: 2019-01-26

## 2019-01-21 ASSESSMENT — ENCOUNTER SYMPTOMS
STRIDOR: 0
CHEST TIGHTNESS: 0
SINUS PAIN: 1
SINUS PRESSURE: 1
NAUSEA: 1
SWOLLEN GLANDS: 0
CHOKING: 0
COUGH: 1
SORE THROAT: 1
WHEEZING: 1
SHORTNESS OF BREATH: 0
RHINORRHEA: 1
APNEA: 0

## 2019-01-21 ASSESSMENT — PAIN DESCRIPTION - LOCATION: LOCATION: THROAT

## 2019-01-21 ASSESSMENT — PAIN DESCRIPTION - PAIN TYPE: TYPE: ACUTE PAIN

## 2019-01-21 ASSESSMENT — PAIN SCALES - GENERAL: PAINLEVEL_OUTOF10: 6

## 2019-01-23 LAB — THROAT/NOSE CULTURE: NORMAL

## 2019-06-24 ENCOUNTER — HOSPITAL ENCOUNTER (EMERGENCY)
Age: 29
Discharge: HOME | End: 2019-06-24

## 2019-06-24 VITALS
TEMPERATURE: 98.4 F | OXYGEN SATURATION: 99 % | RESPIRATION RATE: 16 BRPM | SYSTOLIC BLOOD PRESSURE: 122 MMHG | BODY MASS INDEX: 25.1 KG/M2 | DIASTOLIC BLOOD PRESSURE: 69 MMHG | WEIGHT: 170 LBS | HEART RATE: 70 BPM

## 2019-06-24 DIAGNOSIS — K02.9 PAIN DUE TO DENTAL CARIES: ICD-10-CM

## 2019-06-24 DIAGNOSIS — K04.7 DENTAL INFECTION: Primary | ICD-10-CM

## 2019-06-24 PROCEDURE — 99213 OFFICE O/P EST LOW 20 MIN: CPT

## 2019-06-24 PROCEDURE — 99213 OFFICE O/P EST LOW 20 MIN: CPT | Performed by: NURSE PRACTITIONER

## 2019-06-24 RX ORDER — COVID-19 ANTIGEN TEST
2 KIT MISCELLANEOUS
COMMUNITY
End: 2022-10-27

## 2019-06-24 RX ORDER — AMOXICILLIN 500 MG/1
500 CAPSULE ORAL 3 TIMES DAILY
Qty: 30 CAPSULE | Refills: 0 | Status: SHIPPED | OUTPATIENT
Start: 2019-06-24 | End: 2019-07-04

## 2019-06-24 ASSESSMENT — ENCOUNTER SYMPTOMS
VOMITING: 0
SORE THROAT: 0
FACIAL SWELLING: 1
NAUSEA: 0
COUGH: 0
SHORTNESS OF BREATH: 0

## 2019-06-24 ASSESSMENT — PAIN DESCRIPTION - PAIN TYPE: TYPE: ACUTE PAIN

## 2019-06-24 ASSESSMENT — PAIN DESCRIPTION - LOCATION: LOCATION: TEETH

## 2019-06-24 ASSESSMENT — PAIN DESCRIPTION - ORIENTATION: ORIENTATION: RIGHT;LOWER

## 2019-06-24 ASSESSMENT — PAIN DESCRIPTION - FREQUENCY: FREQUENCY: CONTINUOUS

## 2019-06-24 ASSESSMENT — PAIN SCALES - GENERAL: PAINLEVEL_OUTOF10: 10

## 2019-06-24 ASSESSMENT — PAIN DESCRIPTION - DESCRIPTORS: DESCRIPTORS: ACHING;THROBBING

## 2019-06-24 NOTE — ED TRIAGE NOTES
Patient ambulated to rm. 7. 5 days. Right bottom broken molar pain, face swollen. waiting on dentist appt.

## 2019-06-24 NOTE — ED NOTES
Patient understood instructions verbally,  Follow up with PCP with any concerns, or worse tooth pain, fever,swelling,  follow up with ED. prescriptions with patient. ambulated self to lobby,stable condition.       Liudmila Robles LPN  06/77/56 1969

## 2019-06-24 NOTE — ED PROVIDER NOTES
[facility]  Urgent Care Encounter       CHIEF COMPLAINT       Chief Complaint   Patient presents with    Dental Pain     right lower molar       Nurses Notes reviewed and I agree except as noted in the HPI. HISTORY OF PRESENT ILLNESS   Kathleen Estes is a 26 y.o. male who presents for evaluation of right lower dental pain. Patient reports a significant history of dental issues in the past and states that he does have an appointment to see his dentist, however they cannot get him in until next week. He states that he has had some swelling to the right lower side of his face and he believes that there is an infection. States that he is not so concerned with pain as he has been taking Aleve at home and states that he \"smokes marijuana on the daily\" and this seems to help his pain as well. Patient states he would just like the infection cleared up so that the dentist can deal with the tooth at his visit. He denies any nausea, vomiting, fever, chills, or difficulty opening/closing his mouth. The history is provided by the patient. REVIEW OF SYSTEMS     Review of Systems   Constitutional: Negative for chills and fever. HENT: Positive for dental problem and facial swelling. Negative for congestion and sore throat. Respiratory: Negative for cough and shortness of breath. Cardiovascular: Negative for chest pain. Gastrointestinal: Negative for nausea and vomiting. Musculoskeletal: Negative for arthralgias and myalgias. Skin: Negative for rash. Allergic/Immunologic: Negative for immunocompromised state. Neurological: Negative for headaches. PAST MEDICAL HISTORY   History reviewed. No pertinent past medical history. SURGICALHISTORY     Patient  has a past surgical history that includes Cholecystectomy, laparoscopic (05/24/2017).     CURRENT MEDICATIONS       Previous Medications    NAPROXEN SODIUM (ALEVE) 220 MG CAPS    Take 2 tablets by mouth ALLERGIES     Patient is is allergic to sulfa antibiotics. Patients There is no immunization history for the selected administration types on file for this patient. FAMILY HISTORY     Patient's family history includes High Blood Pressure in his mother. SOCIAL HISTORY     Patient  reports that he has been smoking cigarettes. He has been smoking about 0.25 packs per day. He has never used smokeless tobacco. He reports that he drinks alcohol. He reports that he has current or past drug history. Drug: Marijuana. PHYSICAL EXAM     ED TRIAGE VITALS  BP: 122/69, Temp: 98.4 °F (36.9 °C), Pulse: 70, Resp: 16, SpO2: 99 %,Estimated body mass index is 25.1 kg/m² as calculated from the following:    Height as of 7/8/18: 5' 9\" (1.753 m). Weight as of this encounter: 170 lb (77.1 kg). ,No LMP for male patient. Physical Exam   Constitutional: He is oriented to person, place, and time. He appears well-developed and well-nourished. No distress. HENT:   Mouth/Throat: Oropharynx is clear and moist. No trismus in the jaw. Abnormal dentition. Dental caries present. Erythema and swelling noted to the gums around the right lower second molar consistent with infection. Minimal swelling noted to the right mandible. Eyes: Right conjunctiva is not injected. Left conjunctiva is not injected. Pupils are equal.   Neck: Normal range of motion. Cardiovascular: Normal rate and regular rhythm. No murmur heard. Pulmonary/Chest: Effort normal and breath sounds normal. No respiratory distress. Musculoskeletal:        Right knee: He exhibits normal range of motion. Left knee: He exhibits normal range of motion. Lymphadenopathy:        Head (right side): No submandibular and no tonsillar adenopathy present. Head (left side): No submandibular and no tonsillar adenopathy present. He has no cervical adenopathy. Neurological: He is alert and oriented to person, place, and time. Skin: Skin is warm. No rash noted. He is not diaphoretic. Psychiatric: He has a normal mood and affect. His behavior is normal.       DIAGNOSTIC RESULTS     Labs:No results found for this visit on 06/24/19. IMAGING:    No orders to display         EKG: none      URGENT CARE COURSE:     Vitals:    06/24/19 0854   BP: 122/69   Pulse: 70   Resp: 16   Temp: 98.4 °F (36.9 °C)   TempSrc: Temporal   SpO2: 99%   Weight: 170 lb (77.1 kg)       Medications - No data to display         PROCEDURES:  None    FINAL IMPRESSION      1. Dental infection    2. Pain due to dental caries          DISPOSITION/ PLAN     Exam is consistent with a dental infection at this time. I discussed with the patient the plan to treat with oral antibiotics and he is advised to continue his over-the-counter pain medications at home. Patient is advised to keep his appointment with his dentist for likely removal of that tooth and evaluation of multiple other dental caries. He is agreeable to plan as discussed. PATIENT REFERRED TO:  No primary care provider on file. No primary physician on file.       DISCHARGE MEDICATIONS:  New Prescriptions    AMOXICILLIN (AMOXIL) 500 MG CAPSULE    Take 1 capsule by mouth 3 times daily for 10 days       Discontinued Medications    AZELASTINE (ASTELIN) 0.1 % NASAL SPRAY    1 spray by Nasal route 2 times daily Use in each nostril as directed    MAGIC MOUTHWASH (MIRACLE MOUTHWASH)    Swish and spit 15 mLs 4 times daily as needed for Irritation or Pain Benedryl  12.5mg/5ml     - 30ml  Maalox                                60ml  Viscous Lidocaine              40ml       Current Discharge Medication List          TRENTON Kirby CNP    (Please note that portions of this note were completed with a voice recognition program. Efforts were made to edit the dictations but occasionally words are mis-transcribed.)          TRENTON Kirby CNP  06/24/19 9554

## 2022-10-26 ENCOUNTER — HOSPITAL ENCOUNTER (EMERGENCY)
Age: 32
Discharge: HOME | End: 2022-10-27
Attending: EMERGENCY MEDICINE

## 2022-10-26 DIAGNOSIS — F10.920 ACUTE ALCOHOLIC INTOXICATION WITHOUT COMPLICATION (HCC): Primary | ICD-10-CM

## 2022-10-26 PROCEDURE — 96374 THER/PROPH/DIAG INJ IV PUSH: CPT

## 2022-10-26 PROCEDURE — 99284 EMERGENCY DEPT VISIT MOD MDM: CPT

## 2022-10-26 PROCEDURE — 96375 TX/PRO/DX INJ NEW DRUG ADDON: CPT

## 2022-10-26 ASSESSMENT — PAIN - FUNCTIONAL ASSESSMENT
PAIN_FUNCTIONAL_ASSESSMENT: NONE - DENIES PAIN
PAIN_FUNCTIONAL_ASSESSMENT: NONE - DENIES PAIN

## 2022-10-26 ASSESSMENT — LIFESTYLE VARIABLES
HOW OFTEN DO YOU HAVE A DRINK CONTAINING ALCOHOL: 4 OR MORE TIMES A WEEK
HOW MANY STANDARD DRINKS CONTAINING ALCOHOL DO YOU HAVE ON A TYPICAL DAY: 7 TO 9

## 2022-10-26 NOTE — LETTER
[address] [place] [address] [address] [id number] EMERGENCY DEPT  [address] [place] [address]  Phone: [phone]               October 27, 2022    Patient: Idalia Mccullough   YOB: 1990   Date of Visit: 10/26/2022       To Whom It May Concern:    Pete Lynch was seen and treated in our emergency department on 10/26/2022. He may return to work on October, 28, 2022.       Sincerely,       Jarrell Ken RN         Signature:__________________________________

## 2022-10-27 ENCOUNTER — APPOINTMENT (OUTPATIENT)
Dept: CT IMAGING | Age: 32
End: 2022-10-27

## 2022-10-27 ENCOUNTER — CLINICAL DOCUMENTATION (OUTPATIENT)
Dept: INTERNAL MEDICINE CLINIC | Age: 32
End: 2022-10-27

## 2022-10-27 ENCOUNTER — OFFICE VISIT (OUTPATIENT)
Dept: INTERNAL MEDICINE CLINIC | Age: 32
End: 2022-10-27

## 2022-10-27 VITALS
BODY MASS INDEX: 27.32 KG/M2 | TEMPERATURE: 98.3 F | RESPIRATION RATE: 18 BRPM | WEIGHT: 170 LBS | SYSTOLIC BLOOD PRESSURE: 139 MMHG | OXYGEN SATURATION: 92 % | HEIGHT: 66 IN | HEART RATE: 88 BPM | DIASTOLIC BLOOD PRESSURE: 79 MMHG

## 2022-10-27 VITALS
BODY MASS INDEX: 28.06 KG/M2 | DIASTOLIC BLOOD PRESSURE: 97 MMHG | SYSTOLIC BLOOD PRESSURE: 156 MMHG | RESPIRATION RATE: 20 BRPM | HEIGHT: 66 IN | HEART RATE: 83 BPM | TEMPERATURE: 98.8 F | WEIGHT: 174.6 LBS

## 2022-10-27 DIAGNOSIS — F10.20 ALCOHOL USE DISORDER, MODERATE, DEPENDENCE (HCC): Primary | ICD-10-CM

## 2022-10-27 DIAGNOSIS — F10.920 ACUTE ALCOHOLIC INTOXICATION, UNCOMPLICATED (HCC): ICD-10-CM

## 2022-10-27 DIAGNOSIS — R56.9 ALCOHOL WITHDRAWAL SEIZURE WITHOUT COMPLICATION (HCC): ICD-10-CM

## 2022-10-27 DIAGNOSIS — F10.930 ALCOHOL WITHDRAWAL SEIZURE WITHOUT COMPLICATION (HCC): ICD-10-CM

## 2022-10-27 LAB
ALBUMIN SERPL-MCNC: 4.5 G/DL (ref 3.5–5.1)
ALCOHOL URINE: ABNORMAL
ALP BLD-CCNC: 52 U/L (ref 38–126)
ALT SERPL-CCNC: 128 U/L (ref 11–66)
AMPHETAMINE SCREEN, URINE: ABNORMAL
ANION GAP SERPL CALCULATED.3IONS-SCNC: 18 MEQ/L (ref 8–16)
AST SERPL-CCNC: 169 U/L (ref 5–40)
BARBITURATE SCREEN, URINE: ABNORMAL
BASOPHILS # BLD: 2.2 %
BASOPHILS ABSOLUTE: 0.1 THOU/MM3 (ref 0–0.1)
BENZODIAZEPINE SCREEN, URINE: ABNORMAL
BILIRUB SERPL-MCNC: 0.3 MG/DL (ref 0.3–1.2)
BUN BLDV-MCNC: 5 MG/DL (ref 7–22)
BUPRENORPHINE URINE: ABNORMAL
CALCIUM SERPL-MCNC: 9 MG/DL (ref 8.5–10.5)
CHLORIDE BLD-SCNC: 100 MEQ/L (ref 98–111)
CO2: 24 MEQ/L (ref 23–33)
COCAINE METABOLITE SCREEN URINE: ABNORMAL
CREAT SERPL-MCNC: 0.8 MG/DL (ref 0.4–1.2)
EOSINOPHIL # BLD: 1.6 %
EOSINOPHILS ABSOLUTE: 0.1 THOU/MM3 (ref 0–0.4)
ERYTHROCYTE [DISTWIDTH] IN BLOOD BY AUTOMATED COUNT: 14.5 % (ref 11.5–14.5)
ERYTHROCYTE [DISTWIDTH] IN BLOOD BY AUTOMATED COUNT: 56.5 FL (ref 35–45)
ETHYL ALCOHOL, SERUM: 0.27 %
FENTANYL SCREEN, URINE: ABNORMAL
GABAPENTIN SCREEN, URINE: ABNORMAL
GFR SERPL CREATININE-BSD FRML MDRD: > 60 ML/MIN/1.73M2
GLUCOSE BLD-MCNC: 85 MG/DL (ref 70–108)
HCT VFR BLD CALC: 48.3 % (ref 42–52)
HEMOGLOBIN: 16.3 GM/DL (ref 14–18)
IMMATURE GRANS (ABS): 0.01 THOU/MM3 (ref 0–0.07)
IMMATURE GRANULOCYTES: 0.3 %
LYMPHOCYTES # BLD: 31.9 %
LYMPHOCYTES ABSOLUTE: 1.2 THOU/MM3 (ref 1–4.8)
MCH RBC QN AUTO: 35.6 PG (ref 26–33)
MCHC RBC AUTO-ENTMCNC: 33.7 GM/DL (ref 32.2–35.5)
MCV RBC AUTO: 105.5 FL (ref 80–94)
MDMA URINE: ABNORMAL
METHADONE SCREEN, URINE: ABNORMAL
METHAMPHETAMINE, URINE: ABNORMAL
MONOCYTES # BLD: 10.5 %
MONOCYTES ABSOLUTE: 0.4 THOU/MM3 (ref 0.4–1.3)
NUCLEATED RED BLOOD CELLS: 0 /100 WBC
OPIATE SCREEN URINE: ABNORMAL
OSMOLALITY CALCULATION: 279.6 MOSMOL/KG (ref 275–300)
OXYCODONE SCREEN URINE: ABNORMAL
PHENCYCLIDINE SCREEN URINE: ABNORMAL
PLATELET # BLD: 154 THOU/MM3 (ref 130–400)
PMV BLD AUTO: 10.1 FL (ref 9.4–12.4)
POTASSIUM SERPL-SCNC: 4 MEQ/L (ref 3.5–5.2)
PROPOXYPHENE SCREEN, URINE: ABNORMAL
RBC # BLD: 4.58 MILL/MM3 (ref 4.7–6.1)
SEG NEUTROPHILS: 53.5 %
SEGMENTED NEUTROPHILS ABSOLUTE COUNT: 2 THOU/MM3 (ref 1.8–7.7)
SODIUM BLD-SCNC: 142 MEQ/L (ref 135–145)
SYNTHETIC CANNABINOIDS(K2) SCREEN, URINE: ABNORMAL
THC SCREEN, URINE: ABNORMAL
TOTAL PROTEIN: 6.9 G/DL (ref 6.1–8)
TRAMADOL SCREEN URINE: ABNORMAL
TRICYCLIC ANTIDEPRESSANTS, UR: ABNORMAL
WBC # BLD: 3.7 THOU/MM3 (ref 4.8–10.8)

## 2022-10-27 PROCEDURE — 70450 CT HEAD/BRAIN W/O DYE: CPT

## 2022-10-27 PROCEDURE — 80053 COMPREHEN METABOLIC PANEL: CPT

## 2022-10-27 PROCEDURE — 2580000003 HC RX 258: Performed by: EMERGENCY MEDICINE

## 2022-10-27 PROCEDURE — 36415 COLL VENOUS BLD VENIPUNCTURE: CPT

## 2022-10-27 PROCEDURE — 99204 OFFICE O/P NEW MOD 45 MIN: CPT | Performed by: INTERNAL MEDICINE

## 2022-10-27 PROCEDURE — 85025 COMPLETE CBC W/AUTO DIFF WBC: CPT

## 2022-10-27 PROCEDURE — 82077 ASSAY SPEC XCP UR&BREATH IA: CPT

## 2022-10-27 PROCEDURE — 6360000002 HC RX W HCPCS: Performed by: EMERGENCY MEDICINE

## 2022-10-27 PROCEDURE — 80305 DRUG TEST PRSMV DIR OPT OBS: CPT | Performed by: INTERNAL MEDICINE

## 2022-10-27 RX ORDER — ONDANSETRON 2 MG/ML
4 INJECTION INTRAMUSCULAR; INTRAVENOUS ONCE
Status: COMPLETED | OUTPATIENT
Start: 2022-10-27 | End: 2022-10-27

## 2022-10-27 RX ORDER — KETOROLAC TROMETHAMINE 30 MG/ML
15 INJECTION, SOLUTION INTRAMUSCULAR; INTRAVENOUS ONCE
Status: COMPLETED | OUTPATIENT
Start: 2022-10-27 | End: 2022-10-27

## 2022-10-27 RX ORDER — THIAMINE MONONITRATE (VIT B1) 100 MG
100 TABLET ORAL DAILY
Qty: 30 TABLET | Refills: 3 | Status: SHIPPED | OUTPATIENT
Start: 2022-10-27

## 2022-10-27 RX ORDER — HYDROXYZINE PAMOATE 25 MG/1
25 CAPSULE ORAL 3 TIMES DAILY PRN
Qty: 30 CAPSULE | Refills: 0 | Status: SHIPPED | OUTPATIENT
Start: 2022-10-27 | End: 2022-11-06

## 2022-10-27 RX ORDER — 0.9 % SODIUM CHLORIDE 0.9 %
1000 INTRAVENOUS SOLUTION INTRAVENOUS ONCE
Status: COMPLETED | OUTPATIENT
Start: 2022-10-27 | End: 2022-10-27

## 2022-10-27 RX ORDER — CHLORDIAZEPOXIDE HYDROCHLORIDE 25 MG/1
25 CAPSULE, GELATIN COATED ORAL 4 TIMES DAILY PRN
Qty: 26 CAPSULE | Refills: 0 | Status: CANCELLED | OUTPATIENT
Start: 2022-10-27 | End: 2022-10-31

## 2022-10-27 RX ORDER — LORAZEPAM 2 MG/ML
2 INJECTION INTRAMUSCULAR ONCE
Status: COMPLETED | OUTPATIENT
Start: 2022-10-27 | End: 2022-10-27

## 2022-10-27 RX ADMIN — KETOROLAC TROMETHAMINE 15 MG: 30 INJECTION, SOLUTION INTRAMUSCULAR; INTRAVENOUS at 00:50

## 2022-10-27 RX ADMIN — LORAZEPAM 2 MG: 2 INJECTION INTRAMUSCULAR; INTRAVENOUS at 02:53

## 2022-10-27 RX ADMIN — SODIUM CHLORIDE 1000 ML: 9 INJECTION, SOLUTION INTRAVENOUS at 00:49

## 2022-10-27 RX ADMIN — ONDANSETRON 4 MG: 2 INJECTION INTRAMUSCULAR; INTRAVENOUS at 00:50

## 2022-10-27 ASSESSMENT — PATIENT HEALTH QUESTIONNAIRE - PHQ9
2. FEELING DOWN, DEPRESSED OR HOPELESS: 3
5. POOR APPETITE OR OVEREATING: 3
SUM OF ALL RESPONSES TO PHQ QUESTIONS 1-9: 27
8. MOVING OR SPEAKING SO SLOWLY THAT OTHER PEOPLE COULD HAVE NOTICED. OR THE OPPOSITE, BEING SO FIGETY OR RESTLESS THAT YOU HAVE BEEN MOVING AROUND A LOT MORE THAN USUAL: 3
7. TROUBLE CONCENTRATING ON THINGS, SUCH AS READING THE NEWSPAPER OR WATCHING TELEVISION: 3
SUM OF ALL RESPONSES TO PHQ QUESTIONS 1-9: 24
6. FEELING BAD ABOUT YOURSELF - OR THAT YOU ARE A FAILURE OR HAVE LET YOURSELF OR YOUR FAMILY DOWN: 3
9. THOUGHTS THAT YOU WOULD BE BETTER OFF DEAD, OR OF HURTING YOURSELF: 3
3. TROUBLE FALLING OR STAYING ASLEEP: 3
SUM OF ALL RESPONSES TO PHQ9 QUESTIONS 1 & 2: 6
1. LITTLE INTEREST OR PLEASURE IN DOING THINGS: 3
4. FEELING TIRED OR HAVING LITTLE ENERGY: 3
SUM OF ALL RESPONSES TO PHQ QUESTIONS 1-9: 27
SUM OF ALL RESPONSES TO PHQ QUESTIONS 1-9: 27

## 2022-10-27 ASSESSMENT — ENCOUNTER SYMPTOMS
EYE REDNESS: 0
EYE ITCHING: 0
CHOKING: 0
ABDOMINAL PAIN: 0
DIARRHEA: 0
VOICE CHANGE: 0
BLOOD IN STOOL: 0
SINUS PRESSURE: 0
COUGH: 0
NAUSEA: 0
RHINORRHEA: 0
SORE THROAT: 0
TROUBLE SWALLOWING: 0
EYE PAIN: 0
WHEEZING: 0
SHORTNESS OF BREATH: 0
VOMITING: 0
CONSTIPATION: 0
PHOTOPHOBIA: 0
CHEST TIGHTNESS: 0
ABDOMINAL DISTENTION: 0
EYE DISCHARGE: 0
BACK PAIN: 0

## 2022-10-27 ASSESSMENT — PAIN - FUNCTIONAL ASSESSMENT
PAIN_FUNCTIONAL_ASSESSMENT: NONE - DENIES PAIN
PAIN_FUNCTIONAL_ASSESSMENT: NONE - DENIES PAIN

## 2022-10-27 ASSESSMENT — PAIN SCALES - GENERAL: PAINLEVEL_OUTOF10: 4

## 2022-10-27 ASSESSMENT — PAIN DESCRIPTION - LOCATION: LOCATION: HEAD

## 2022-10-27 ASSESSMENT — PAIN DESCRIPTION - DESCRIPTORS: DESCRIPTORS: ACHING

## 2022-10-27 NOTE — PROGRESS NOTES
RN called patient's daughter Dee Dee back.   Home health nurse did mention that it was possible for IV fluids at home through home health.  Dee Dee is willing to take Graciela to an infusion center but if it can be set up through home health this would be preferred.      Pt had labs drawn tonight at [facility].  They will accept a standing order.  Dr. Horton is it possible to place a standing order for patient and have it faxed to the [facility]?      Daughter was advised on recommendations from Dr. Palacios as stated below.  She will try the recommendations to get more fluids in patient.      Dr. Horton please also advise on a plan.  Daughter was also looking for clarification on appointments needed with MD with having home health nurse?  Can you please advise on what you would recommend for follow up with MD in clinic with having home health nurse coming to see patient?     Thank you.    Buffy Whitley RN   [facility] RN Triage       Verbal order per Dr. Raquel Lainez for urine drug screen. Positive for Benzo, ETOH, and THC. Verified results with Eddie Thornton LPN. Dr. Raquel Lainez ordered Librium 50 mg po every 6 hours for 2 days then 25 mg every 4 hours for 1 day then 25 mg every 6 hours for 1 day for patient. Verified dose with patient. Patient was sent home with Librium and Thiamine and will be seen back in the office 10/31/2022.

## 2022-10-27 NOTE — PROGRESS NOTES
MEDICATION ASSISTED TREATMENT ENCOUNTER    HISTORY OF PRESENT ILLNESS  Patient presents for evaluation of opioid use disorder and wants  medication assisted treatment  Patient heard about us through [facility] emergency room  He was there last night after his partner found him seizing  His alcohol level in the emergency room was 0.27  Patient has had problems using alcohol  Patient started using alcohol 4 years ago  He said he had left his wife at that time was missing her  He would come home and drink till he got drunk    Other drugs used: Occasional THC  At his peak he was drinking a half a gallon of bourbon daily  He has been trying to cut back  He is down to about a pint a day  Patient has had withdrawal seizures before  Patient got some Ativan in the emergency room  They gave him a prescription for Vistaril to go home with  He read that he cannot really drink when using Vistaril  His goal is complete cessation of alcohol  He says he is a happy drunk its not really causing difficulties with his work or relationships  He does not have any DUIs  He just knows what is doing to his body  Past Medical History:   Diagnosis Date    Anxiety     Cancer ([facility]     Depression     Hypertension     Seizures ([facility]     Substance abuse ([facility]        Past Surgical History:   Procedure Laterality Date    CHOLECYSTECTOMY, LAPAROSCOPIC  05/24/2017    Dr Maki Alvarado: Never been diagnosed with depression but he thinks he has it  That is why has been drinking    Allergies   Allergen Reactions    Sulfa Antibiotics        Current Outpatient Medications   Medication Sig Dispense Refill    vitamin B-1 (THIAMINE) 100 MG tablet Take 1 tablet by mouth daily 30 tablet 3     No current facility-administered medications for this visit.          SOCIAL     Marital status lives with his girlfriend     Children 6 children with his ex-wife he is close to them [employer] girlfriend, mother stepfather all supportive     Legal issues patient spent 11 and half years in juvenile long[facility] for stealing a gun     Tobacco: yes, cigarettes                    ROS     General: Patient denies fevers, chills ,weight changes, sweats     Psych: No depression, anxiety, suicidal ideation or attempts     Endocrine: No thyroid issues,no neck pain, no galactorrhea, no weight changes     Pulmonary: No shortness of breath, orthopnea, PND     Cardiac: No chest pain,syncope, no history of cardiac issues     GI: No trouble with bowels, no abdominal pain     : No dysuria, nocturia, urgency, frequency     MS: Patient denies bone or joint aches, no myalgias     Neuro: Patient denies headaches, seizures, tremors     Skin: No skin lesions, rashes    PHYSICAL EXAM     Blood pressure (!) 156/97, pulse 83, temperature 98.8 °F (37.1 °C), temperature source Tympanic, resp. rate 20, height 5' 6\" (1.676 m), weight 174 lb 9.6 oz (79.2 kg).       Mental status examination    Cognition: oriented to person place and time      Appearance: Patient is in no acute distress, normal appearance, patient does not appear intoxicated/    Memory: Normal    Behavior/motor: Normal    Mood: Good mood, upbeat    Affect: Mood congruent    Attitude toward examiner: Pleasant, respectful    Thought content no delusions hallucinations suicidal ideation    Insight: fair    Judgment: faif    Eyes: Pupils normal    Skin: No track marks noted ,no rashes noted    COWS score: N/A    No data recorded                  URINE DRUG SCREEN TODAY:       Component 10/27/22 1514    Alcohol, Urine POS    Amphetamine Screen, Urine NEG    Barbiturate Screen, Urine NEG    Benzodiazepine Screen, Urine POS    Buprenorphine Urine NEG    Cocaine Metabolite Screen, Urine NEG    FENTANYL SCREEN, URINE NEG    Gabapentin Screen, Urine N/A    MDMA, Urine NEG    Methadone Screen, Urine NEG    Methamphetamine, Urine NEG Opiate Scrn, Ur NEG    Oxycodone Screen, Ur NEG    PCP Screen, Urine N/A    Propoxyphene Screen, Urine NEG    Synthetic Cannabinoids (K2) Screen, Urine NEG    THC Screen, Urine POS    Tramadol Scrn, Ur NEG    Tricyclic Antidepressants, Urine N/A           Diagnosis Orders   1. Alcohol use disorder, moderate, dependence ([facility]        2. Acute alcoholic intoxication, uncomplicated ([facility])  POCT Rapid Drug Screen    vitamin B-1 (THIAMINE) 100 MG tablet      3.  Alcohol withdrawal seizure without complication ([facility]              PLAN: Urine has alcohol as well as THC  Urine has benzos consistent with him getting Ativan in the hospital  I discussed long-term maintenance therapy for alcohol use disorder including Antabuse and possibly naltrexone  I discussed further benzodiazepine therapy with Librium for alcohol withdrawal he declines  I told him he needs to seek counseling including AA meetings  He seems somewhat resistant to that  We will start thiamine  Follow-up 10/31  I offered him to see Phan Ha our   He said he will think things over and let me know

## 2022-10-27 NOTE — PROGRESS NOTES
Patient contacted office to complete referral from e/d. Patient reports that he has been drinking since he was 28 yrs old and it has increased. Patient reports that he has been able to stair step himself down from a liter of whiskey to a pint. Patient is concerned of filling the prescription medication that the e/d prescribed for him and taking it while drinking due to completing his own research of the medication. Sw offered support completed new patient screening, offered MAT treatment. Patient does not have insurance at this time. Patient would like to speak to a nurse about the medication and the interaction. Sw connected patient to REHABILITATION [facility]. Sw offered to get patient scheduled with Dr. Chris Trimble this afternoon or even scheduled in the morning with providers. Sw encouraged patient to be scheduled for a intake. Sw will verify that nurses spoke to patient.

## 2022-10-27 NOTE — PROGRESS NOTES
0045  Pt given OP counseling and AOD resources as well as inpatient AOD information as requested by Dr. Kimberly Krause.

## 2022-10-27 NOTE — ED NOTES
Pt vitals collected. Pt denies any needs at this time. Pt respirations easy and unlabored.      Avel Pride RN  10/27/22 0000

## 2022-10-27 NOTE — DISCHARGE INSTRUCTIONS
Patient has what appears to be alcohol problem. Patient is instructed to follow-up with the resources as provided by behavioral health. Specifically he is instructed to call Dr. Almaz Sierra office and schedule a follow-up appointment. Patient is instructed to take all home medications as prescribed. Patient has been given a prescription for Vistaril he is instructed to use this to help with any shaking. Patient is instructed to return to the nearest emergency room immediately for any new or worsening complaints.

## 2022-10-27 NOTE — ED PROVIDER NOTES
polydipsia, polyphagia and polyuria. Genitourinary:  Negative for decreased urine volume, difficulty urinating, dysuria, enuresis, frequency, hematuria, penile swelling, scrotal swelling, testicular pain and urgency. Musculoskeletal:  Negative for arthralgias, back pain, gait problem, myalgias, neck pain and neck stiffness. Skin:  Negative for pallor and rash. Allergic/Immunologic: Negative for immunocompromised state. Neurological:  Positive for headaches. Negative for dizziness, tremors, seizures, syncope, facial asymmetry, weakness, light-headedness and numbness. Hematological:  Negative for adenopathy. Does not bruise/bleed easily. Psychiatric/Behavioral:  Negative for agitation, hallucinations and suicidal ideas. The patient is not nervous/anxious. PAST MEDICAL HISTORY    has no past medical history on file. SURGICAL HISTORY      has a past surgical history that includes Cholecystectomy, laparoscopic (05/24/2017). CURRENT MEDICATIONS       Discharge Medication List as of 10/27/2022  2:35 AM        CONTINUE these medications which have NOT CHANGED    Details   Naproxen Sodium (ALEVE) 220 MG CAPS Take 2 tablets by mouthHistorical Med             ALLERGIES     is allergic to sulfa antibiotics. FAMILY HISTORY     He indicated that his mother is alive. He indicated that his father is alive. family history includes High Blood Pressure in his mother. SOCIAL HISTORY      reports that he has been smoking cigarettes. He has been smoking an average of .25 packs per day. He has never used smokeless tobacco. He reports current alcohol use. He reports current drug use. Drug: Marijuana Nan Aaron). PHYSICAL EXAM     INITIAL VITALS:  height is 5' 6\" (1.676 m) and weight is 170 lb (77.1 kg). His oral temperature is 98.3 °F (36.8 °C). His blood pressure is 139/79 and his pulse is 88. His respiration is 18 and oxygen saturation is 92%. Physical Exam  Vitals and nursing note reviewed. Constitutional:       General: He is not in acute distress. Appearance: He is well-developed. He is not diaphoretic. HENT:      Head: Normocephalic and atraumatic. Right Ear: Tympanic membrane, ear canal and external ear normal.      Left Ear: Tympanic membrane, ear canal and external ear normal.      Nose: Nose normal. No congestion. Mouth/Throat:      Mouth: Mucous membranes are moist.      Pharynx: Oropharynx is clear. Eyes:      General: Lids are normal. No scleral icterus. Right eye: No discharge. Left eye: No discharge. Conjunctiva/sclera: Conjunctivae normal.      Right eye: No exudate. Left eye: No exudate. Pupils: Pupils are equal, round, and reactive to light. Neck:      Thyroid: No thyromegaly. Vascular: No JVD. Trachea: No tracheal deviation. Cardiovascular:      Rate and Rhythm: Normal rate and regular rhythm. Pulses: Normal pulses. Heart sounds: Normal heart sounds, S1 normal and S2 normal. No murmur heard. No friction rub. No gallop. Pulmonary:      Effort: Pulmonary effort is normal. No respiratory distress. Breath sounds: Normal breath sounds. No stridor. No wheezing, rhonchi or rales. Chest:      Chest wall: No tenderness. Abdominal:      General: Bowel sounds are normal. There is no distension. Palpations: Abdomen is soft. There is no mass. Tenderness: There is no abdominal tenderness. There is no guarding or rebound. Musculoskeletal:         General: No tenderness. Normal range of motion. Cervical back: Normal range of motion and neck supple. Normal range of motion. Lymphadenopathy:      Cervical: No cervical adenopathy. Skin:     General: Skin is warm and dry. Capillary Refill: Capillary refill takes less than 2 seconds. Findings: No bruising, ecchymosis, lesion or rash. Neurological:      General: No focal deficit present.       Mental Status: He is alert and oriented to person, place, and time. Mental status is at baseline. Cranial Nerves: No cranial nerve deficit. Sensory: No sensory deficit. Motor: No weakness. Coordination: Coordination normal.      Gait: Gait normal.      Deep Tendon Reflexes: Reflexes are normal and symmetric. Reflexes normal.   Psychiatric:         Mood and Affect: Mood normal.         Speech: Speech normal.         Behavior: Behavior normal.         Thought Content: Thought content normal.         Judgment: Judgment normal.         DIFFERENTIAL DIAGNOSIS:   Alcohol withdrawal, alcohol intoxication, anxiety headache    DIAGNOSTIC RESULTS     EKG: All EKG's are interpreted by the Emergency Department Physician who either signs or Co-signs this chart in the absence of a cardiologist.  None    RADIOLOGY: non-plain film images(s) such as CT, Ultrasound and MRI are read by the radiologist.  CT HEAD WO CONTRAST   Final Result   Impression:   1. No acute intracranial abnormality. Age-appropriate exam.      This document has been electronically signed by: Elise Rogers MD on    10/27/2022 01:50 AM      All CTs at this facility use dose modulation techniques and iterative    reconstructions, and/or weight-based dosing   when appropriate to reduce radiation to a low as reasonably achievable. Felix Kemp     LABS:   Labs Reviewed   CBC WITH AUTO DIFFERENTIAL - Abnormal; Notable for the following components:       Result Value    WBC 3.7 (*)     RBC 4.58 (*)     .5 (*)     MCH 35.6 (*)     RDW-SD 56.5 (*)     All other components within normal limits   COMPREHENSIVE METABOLIC PANEL - Abnormal; Notable for the following components:    BUN 5 (*)      (*)      (*)     All other components within normal limits   ANION GAP - Abnormal; Notable for the following components:    Anion Gap 18.0 (*)     All other components within normal limits   ETHANOL   GLOMERULAR FILTRATION RATE, ESTIMATED   OSMOLALITY       EMERGENCY DEPARTMENT COURSE:   Vitals: Vitals:    10/26/22 2334 10/26/22 2359 10/27/22 0055 10/27/22 0157   BP: 137/89 138/82 133/74 139/79   Pulse: 94 92 87 88   Resp: 15 14 14 18   Temp: 98.3 °F (36.8 °C)      TempSrc: Oral      SpO2: 96% 93% 92% 92%   Weight: 170 lb (77.1 kg)      Height: 5' 6\" (1.676 m)        Patient was assessed at bedside labs and imaging were ordered. Patient was given Toradol Zofran and fluids. Patient is being very evasive with me here today. I did call BREA and had her go in and provide the patient with resources. Here today the patient remained hemodynamically stable. Did not appear to be going in withdrawal.  Patient is making a very conscious effort to do this on his own. He stated to me that he did not want to be admitted for anything. He wanted to know if he could do this on his own. I instructed him to follow-up with resources as provided by behavioral health he was clinic be given Vistaril. He was given some Ativan here to help him. Patient had a plan of slowly decreasing his alcohol intake. Eventually being able to get rid of it completely. He states he has been on this for the last 3 weeks and he has been successful. At this point I wish the patient good luck in his endeavors. Told him that should he fall off the wagon or have any trouble he should come back to the emergency room immediately. Patient understood and agreed with this plan. Patient is subsequently discharged home and to wife's care in stable condition. Patient has what appears to be alcohol problem. Patient is instructed to follow-up with the resources as provided by behavioral health. Specifically he is instructed to call Dr. Keiry Flaherty office and schedule a follow-up appointment. Patient is instructed to take all home medications as prescribed. Patient has been given a prescription for Vistaril he is instructed to use this to help with any shaking.   Patient is instructed to return to the nearest emergency room immediately for any new or worsening complaints. CRITICAL CARE:   None    CONSULTS:  None    PROCEDURES:  None    FINAL IMPRESSION      1.  Acute alcoholic intoxication without complication [facility])          DISPOSITION/PLAN   Discharge    PATIENT REFERRED TO:  David Ramirez MD  [address] [place] [id number]  [phone]    Call today      Resources  As provided by behavioral health  Call in 2 days      DISCHARGE MEDICATIONS:  Discharge Medication List as of 10/27/2022  2:35 AM        START taking these medications    Details   hydrOXYzine pamoate (VISTARIL) 25 MG capsule Take 1 capsule by mouth 3 times daily as needed for Itching, Disp-30 capsule, R-0Print             (Please note that portions of this note were completed with a voice recognition program.  Efforts were made to edit the dictations but occasionally words are mis-transcribed.)    Mikala Caldwell, [address] [place] [address],   10/27/22 8775

## 2022-10-27 NOTE — ED NOTES
Pt vitals collected. Pt resting in bed with eyes closed. Pt respirations easy and unlabored.      Gabbie Dela Cruz RN  10/27/22 1748

## 2022-10-27 NOTE — ED TRIAGE NOTES
Pt presents to ED with c/o alcohol withdrawal symptoms. Pt states he has been trying top quit drinking for about 3 weeks after being an alcoholic for 4 years. Pt states he has been feeling numbness and has mild tremors occasionally. Pt states he tried to quit cold turkey, but it was just to hard. Pt states he has been trying to wean himslef off. Pt states his last drink was around 1400 today. VSS. Call light in reach.

## 2022-10-27 NOTE — ED NOTES
Pt vitals collected. Pt denies any needs at this time. Pt respirations easy and unlabored.      Melissa Moore RN  10/27/22 8960

## 2022-11-01 ENCOUNTER — CLINICAL DOCUMENTATION (OUTPATIENT)
Dept: INTERNAL MEDICINE CLINIC | Age: 32
End: 2022-11-01

## 2022-11-01 NOTE — PROGRESS NOTES
Sw contacted patient due to missed appointment in October. Mary was unable to leave [facility].

## 2025-01-09 NOTE — ED NOTES
Pt advised on need of urine. Urinal provided.       Travis Rosales RN  09/28/17 4733
Pt denies chest pain at this time.       Travis Rosales RN  09/28/17 4560
Urine obtained and sent to lab. Pt repositioned on cot. [place] provided. Call light within reach. Will continue to monitor for safety and comfort.       Jolie Martines RN  09/28/17 2360
no